# Patient Record
Sex: MALE | Race: BLACK OR AFRICAN AMERICAN | NOT HISPANIC OR LATINO | Employment: UNEMPLOYED | URBAN - METROPOLITAN AREA
[De-identification: names, ages, dates, MRNs, and addresses within clinical notes are randomized per-mention and may not be internally consistent; named-entity substitution may affect disease eponyms.]

---

## 2017-03-12 ENCOUNTER — HOSPITAL ENCOUNTER (EMERGENCY)
Facility: OTHER | Age: 32
Discharge: HOME | End: 2017-03-12
Attending: EMERGENCY MEDICINE

## 2017-03-12 VITALS
RESPIRATION RATE: 18 BRPM | BODY MASS INDEX: 36.1 KG/M2 | OXYGEN SATURATION: 94 % | SYSTOLIC BLOOD PRESSURE: 146 MMHG | WEIGHT: 230 LBS | TEMPERATURE: 98 F | DIASTOLIC BLOOD PRESSURE: 76 MMHG | HEART RATE: 78 BPM | HEIGHT: 67 IN

## 2017-03-12 DIAGNOSIS — S50.362A: ICD-10-CM
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
## 2017-03-12 DIAGNOSIS — J45.901 ASTHMA EXACERBATION: Primary | ICD-10-CM

## 2017-03-12 PROCEDURE — 99284 EMERGENCY DEPT VISIT MOD MDM: CPT | Mod: 25

## 2017-03-12 PROCEDURE — 94640 AIRWAY INHALATION TREATMENT: CPT

## 2017-03-12 PROCEDURE — 25000003 PHARM REV CODE 250: Performed by: EMERGENCY MEDICINE

## 2017-03-12 PROCEDURE — 25000242 PHARM REV CODE 250 ALT 637 W/ HCPCS: Performed by: EMERGENCY MEDICINE

## 2017-03-12 RX ORDER — DIPHENHYDRAMINE HCL 25 MG
25 CAPSULE ORAL EVERY 6 HOURS PRN
Qty: 20 CAPSULE | Refills: 0 | COMMUNITY
Start: 2017-03-12

## 2017-03-12 RX ORDER — SULFAMETHOXAZOLE AND TRIMETHOPRIM 800; 160 MG/1; MG/1
1 TABLET ORAL 2 TIMES DAILY
Qty: 14 TABLET | Refills: 0 | Status: SHIPPED | OUTPATIENT
Start: 2017-03-12 | End: 2017-03-19

## 2017-03-12 RX ORDER — IPRATROPIUM BROMIDE AND ALBUTEROL SULFATE 2.5; .5 MG/3ML; MG/3ML
3 SOLUTION RESPIRATORY (INHALATION)
Status: COMPLETED | OUTPATIENT
Start: 2017-03-12 | End: 2017-03-12

## 2017-03-12 RX ORDER — SULFAMETHOXAZOLE AND TRIMETHOPRIM 800; 160 MG/1; MG/1
2 TABLET ORAL
Status: COMPLETED | OUTPATIENT
Start: 2017-03-12 | End: 2017-03-12

## 2017-03-12 RX ORDER — PREDNISONE 20 MG/1
60 TABLET ORAL DAILY
Qty: 15 TABLET | Refills: 0 | Status: SHIPPED | OUTPATIENT
Start: 2017-03-12 | End: 2017-03-17

## 2017-03-12 RX ADMIN — SULFAMETHOXAZOLE AND TRIMETHOPRIM 2 TABLET: 800; 160 TABLET ORAL at 09:03

## 2017-03-12 RX ADMIN — IPRATROPIUM BROMIDE AND ALBUTEROL SULFATE 3 ML: .5; 3 SOLUTION RESPIRATORY (INHALATION) at 09:03

## 2017-03-12 NOTE — ED AVS SNAPSHOT
[facility]  [address]  [facility] [address]               Mike Campbell   3/12/2017  8:54 AM   ED    Description:  Male : 1985   Department:  [facility]           Your Care was Coordinated By:     Provider Role From To    Casandra Hill MD Attending Provider 17 0900 --      Reason for Visit     Rash           Diagnoses this Visit        Comments    Asthma exacerbation    -  Primary     Insect bite of elbow with local reaction, left, initial encounter           ED Disposition     None           To Do List           Follow-up Information     Schedule an appointment as soon as possible for a visit with [facility].    Specialties:  Behavioral Health, Psychiatry    Why:  As needed - or your regular primary doctor if you have one    Contact information:    [address]  [facility] [address]  [phone]          Follow up with [facility].    Specialty:  Emergency Medicine    Why:  As needed, If symptoms worsen    Contact information:    [address]  [facility] [address] [phone]       These Medications        Disp Refills Start End    sulfamethoxazole-trimethoprim 800-160mg (BACTRIM DS) 800-160 mg Tab 14 tablet 0 3/12/2017 3/19/2017    Take 1 tablet by mouth 2 (two) times daily. - Oral    predniSONE (DELTASONE) 20 MG tablet 15 tablet 0 3/12/2017 3/17/2017    Take 3 tablets (60 mg total) by mouth once daily. - Oral      PURCHASE these Medications (No prescription required)        Start End    diphenhydrAMINE (BENADRYL) 25 mg capsule 3/12/2017     Sig - Route: Take 1 each (25 mg total) by mouth every 6 (six) hours as needed for Itching or Allergies. - Oral    Class: OTC      [facility]     [facility] -  Assistance  Registered nurses in the [facility] provide clinical advisement, health education, appointment booking, and other advisory  services.  Call for this free service at [phone].             Medications           Message regarding Medications     Verify the changes and/or additions to your medication regime listed below are the same as discussed with your clinician today.  If any of these changes or additions are incorrect, please notify your healthcare provider.        START taking these NEW medications        Refills    sulfamethoxazole-trimethoprim 800-160mg (BACTRIM DS) 800-160 mg Tab 0    Sig: Take 1 tablet by mouth 2 (two) times daily.    Class: Print    Route: Oral    diphenhydrAMINE (BENADRYL) 25 mg capsule 0    Sig: Take 1 each (25 mg total) by mouth every 6 (six) hours as needed for Itching or Allergies.    Class: OTC    Route: Oral    predniSONE (DELTASONE) 20 MG tablet 0    Sig: Take 3 tablets (60 mg total) by mouth once daily.    Class: Print    Route: Oral      These medications were administered today        Dose Freq    sulfamethoxazole-trimethoprim 800-160mg per tablet 2 tablet 2 tablet ED 1 Time    Sig: Take 2 tablets by mouth ED 1 Time.    Class: Normal    Route: Oral    albuterol-ipratropium 2.5mg-0.5mg/3mL nebulizer solution 3 mL 3 mL Every 5 min    Sig: Take 3 mLs by nebulization every 5 (five) minutes.    Class: Normal    Route: Nebulization           Verify that the below list of medications is an accurate representation of the medications you are currently taking.  If none reported, the list may be blank. If incorrect, please contact your healthcare provider. Carry this list with you in case of emergency.           Current Medications     ALBUTEROL SULFATE INHL Inhale into the lungs.    BECLOMETHASONE DIPROPIONATE (QVAR INHL) Inhale into the lungs.    diphenhydrAMINE (BENADRYL) 25 mg capsule Take 1 each (25 mg total) by mouth every 6 (six) hours as needed for Itching or Allergies.    predniSONE (DELTASONE) 20 MG tablet Take 3 tablets (60 mg total) by mouth once daily.    sulfamethoxazole-trimethoprim 800-160mg  "(BACTRIM DS) 800-160 mg Tab Take 1 tablet by mouth 2 (two) times daily.           Clinical Reference Information           Your Vitals Were     BP Pulse Temp Resp Height Weight    128/75 (BP Location: Left arm, Patient Position: Sitting) 74 97.8 °F (36.6 °C) (Oral) 18 5' 7" (1.702 m) 104.3 kg (230 lb)    SpO2 BMI             99% 36.02 kg/m2         Allergies as of 3/12/2017     No Known Allergies      Immunizations Administered on Date of Encounter - 3/12/2017     None      ED Micro, Lab, POCT     None      ED Imaging Orders     None      Discharge References/Attachments     ASTHMA, ACUTE (ADULT) (ENGLISH)    INSECT STING/BITE, INFECTED (ENGLISH)      [facility] Sign-Up     Activating your [facility] account is as easy as 1-2-3!     1) Visit [email or web address], select Sign Up Now, enter this activation code and your date of birth, then select Next.  [email or web address]  Expires: 4/26/2017 10:21 AM      2) Create a username and password to use when you visit [facility] in the future and select a security question in case you lose your password and select Next.    3) Enter your e-mail address and click Sign Up!    Additional Information  If you have questions, please e-mail [email or web address] or call [phone] to talk to our [facility] staff. Remember, [facility] is NOT to be used for urgent needs. For medical emergencies, dial 911.          [facility] complies with applicable Federal civil rights laws and does not discriminate on the basis of race, color, national origin, age, disability, or sex.        Language Assistance Services     ATTENTION: Language assistance services are available, free of charge. Please call [phone].      ATENCIÓN: Si habla starr, tiene a whelan disposición servicios gratuitos de asistencia lingüística. Llame al [phone].     CHÚ Ý: N?u b?n nói Ti?ng Vi?t, có các d?ch v? h? tr? ngôn ng? mi?n phí dành cho b?n. G?i s? [phone].        "

## 2017-03-12 NOTE — ED TRIAGE NOTES
Pt states rash to inside of left arm he noticed yesterday - area just above elbow with red, raised areas. Thinks something may have bit him

## 2017-03-12 NOTE — ED NOTES
Pt given d/c instruction to include medications and follow up care and verbalized understanding. Pt ambulated with steady gait to d/c window and d/c in stable condition.

## 2017-03-12 NOTE — ED PROVIDER NOTES
Encounter Date: 3/12/2017    SCRIBE #1 NOTE: I, Irene Oneal, am scribing for, and in the presence of,  Dr. Hill. I have scribed the entire note.       History     Chief Complaint   Patient presents with    Rash     Patient c/o a rash with itching and burning to inside of left arm that he noticed yesterday. Patient thinks something bit him but is unsure.      Review of patient's allergies indicates:  No Known Allergies  HPI Comments: Time seen by provider: 9:18 AM    This is a 31 y.o. male who presents with complaint of lesion to left arm. He reports onset of symptoms was 1 day ago. The patient states he first noted the lesion to the forearm yesterday afternoon. He is uncertain if something bit him. The patient states the lesion is itchy. He notes mild swelling and redness to the left forearm. The patient denies any fever, chills, nausea or vomiting. The patient denies the use of any medication for the symptoms.     The history is provided by the patient.     Past Medical History:   Diagnosis Date    Asthma      Past Surgical History:   Procedure Laterality Date    ABDOMINAL SURGERY       History reviewed. No pertinent family history.  Social History   Substance Use Topics    Smoking status: Current Some Day Smoker    Smokeless tobacco: None    Alcohol use Yes     Review of Systems   Constitutional: Negative for chills and fever.   HENT: Negative for congestion and sore throat.    Eyes: Negative for redness and visual disturbance.   Respiratory: Negative for cough and shortness of breath.    Cardiovascular: Negative for chest pain and palpitations.   Gastrointestinal: Negative for abdominal pain, diarrhea, nausea and vomiting.   Genitourinary: Negative for dysuria.   Musculoskeletal: Negative for back pain.   Skin: Negative for rash.        Left forearm skin lesions   Neurological: Negative for weakness and headaches.   Psychiatric/Behavioral: Negative for confusion.       Physical Exam   Initial Vitals    BP Pulse Resp Temp SpO2   03/12/17 0846 03/12/17 0846 03/12/17 0846 03/12/17 0846 03/12/17 0846   128/75 77 14 97.8 °F (36.6 °C) 97 %     Physical Exam    Nursing note and vitals reviewed.  Constitutional: He appears well-developed and well-nourished. He is not diaphoretic. No distress.   HENT:   Head: Normocephalic and atraumatic.   Right Ear: External ear normal.   Left Ear: External ear normal.   Eyes: Conjunctivae and EOM are normal.   Neck: Normal range of motion. Neck supple.   Cardiovascular: Normal rate, regular rhythm and normal heart sounds. Exam reveals no gallop and no friction rub.    No murmur heard.  Pulmonary/Chest: No respiratory distress. He has wheezes. He has no rhonchi. He has no rales.   Inspiratory and expiratory wheezes throughout   Musculoskeletal: Normal range of motion. He exhibits no edema or tenderness.   Lymphadenopathy:     He has no cervical adenopathy.   Neurological: He is alert and oriented to person, place, and time. He has normal strength.   Skin: Skin is warm and dry. There is erythema.   Area to left inner elbow there are 4 erythematous papules with surrounding induration. No fluctuance         ED Course   Procedures  Labs Reviewed - No data to display          Medical Decision Making:   ED Management:  10:05 AM- Patient is clear to auscultation on re-examination.    Emergent evaluation of 31-year-old male with complaint of skin lesion times one day.  On exam, he has inspiratory and expiratory wheezes, as well as area of grouped papules with erythematous the elbow.  I suspect that this is insect bites with local reaction.  Patient is very concerned for infection, and it is tender to the touch and indurated.  We'll treat for cellulitis with Bactrim.  I did suggest over-the-counter Benadryl, which was not given here due to to the fact that he is driving, did not want to cause drowsiness.  Additionally, he was given DuoNeb with resolution and wheezing.  He was discharged in good  condition with prescription for Bactrim as well as prednisone burst and refill on albuterol.            Scribe Attestation:   Scribe #1: I performed the above scribed service and the documentation accurately describes the services I performed. I attest to the accuracy of the note.    Attending Attestation:           Physician Attestation for Scribe:  Physician Attestation Statement for Scribe #1: I, Dr. Hill, reviewed documentation, as scribed by Irene Oneal in my presence, and it is both accurate and complete.                 ED Course     Clinical Impression:     1. Asthma exacerbation    2. Insect bite of elbow with local reaction, left, initial encounter              Casandra Hill MD  03/12/17 4150